# Patient Record
Sex: FEMALE | Race: WHITE | Employment: FULL TIME | URBAN - METROPOLITAN AREA
[De-identification: names, ages, dates, MRNs, and addresses within clinical notes are randomized per-mention and may not be internally consistent; named-entity substitution may affect disease eponyms.]

---

## 2020-09-30 ENCOUNTER — HOSPITAL ENCOUNTER (OUTPATIENT)
Dept: PHYSICAL THERAPY | Age: 60
Discharge: HOME | End: 2020-09-30
Payer: COMMERCIAL

## 2020-09-30 PROCEDURE — 97162 PT EVAL MOD COMPLEX 30 MIN: CPT | Performed by: PHYSICAL THERAPIST

## 2020-09-30 PROCEDURE — 97112 NEUROMUSCULAR REEDUCATION: CPT | Performed by: PHYSICAL THERAPIST

## 2020-09-30 NOTE — PROGRESS NOTES
PT INITIAL EVALUATION NOTE 2-15    Patient Name: Kelsie Quiroga  Date:2020  : 1960  [x]  Patient  Verified  Payor: BLUE CROSS / Plan: [address] [place] [address] / Product Type: PPO /    In time:10:35 am  Out time:11:40 am  Total Treatment Time (min): 65  Visit #: 1     Treatment Area: Scoliosis [M41.9]    SUBJECTIVE  Pain Level (0-10 scale): 2/10 up to 7/10  Any medication changes, allergies to medications, adverse drug reactions, diagnosis change, or new procedure performed?: [] No    [x] Yes (see summary sheet for update)  Subjective:     Her walking will release her low back and make her feel more mobile. She does daily yoga and feels like she is able to reduce her tightness and strengthen her core. The tightness between her shoulder blades reduces. She feels this mentally relaxes. Wears glasses - are trifocals and there is no blurring. Does not get dizziness. She is missing her back upper L tooth and she has not had this tooth for 3 years. Wearing [employer] indoor soccer shoe. Wears a lot of sandals and flats. Her previous surgery was Lumbar. Current symptoms are cervical.  PLOF: yoga (daily), swim (not this summer, but normally in summer), walking (about 2-4 miles each day for 5 days per week)  Mechanism of Injury: insidious onset of neck pain; dx with scoliosis at 9 y.o.a. - was not braced  Previous Treatment/Compliance: had 1 hr long session of yoga/pilates intervention  PMHx/Surgical Hx: Lumbar 3-5 fused 2017 secondary to lumbar radiculopathy that was persistent. She had min PT following.  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, lifting, driving, flying (lifting could be as high as 45 lb)  Living Situation: stairs 13 steps handrail bilaterally; lives alone, no pets  Pt Goals: to reduce neck pain and discomfort  Barriers: missing L tooth  Motivation: excellent   Substance use: none stated, see med list in pt chart   FABQ Score: see FOTO  Cognition: A & O x 4        OBJECTIVE/EXAMINATION    [facility] [place] (GILBERT) Evaluation    Posture: upright posture; in sitting was hyperextended through lumbar and lower thoracic spine  Other Observations: pt is thin             Left   Right  Standing  Standing Reach Test (M)    2 inches     Functional Squat Test  (P)    Level 2       Sitting  FA IR R.O.M. (M)     30 degrees  20 degrees  FA ER R.O.M.  (M)     40 degrees  40 degrees  FA IR Strength (M)           FA ER Strength (M)            Sidelying  Adduction Drop Test (M)    +   -  Hruska Adduction Lift Test (M)   1   1  Hruska Abduction Lift Test (M)        Pelvic Otter Tail Drop Test (PADT) (P)  +   +  Passive Abduction Raise Test (PART) (P)       Passive IP ER Expansion Test (P)          Supine  Extension Drop Test (M)    NT - to be tested; inadvertently omitted     Trunk Rotation (M)     14 inches  15 inches  Straight Leg Raise (M)       Apical Expansion (R)     Significantly limited bilaterally   *  Horizontal Abduction (R)    45 degrees  60 degrees  Shoulder Flexion (R)       HG IR (R)      70 degrees  50 degrees  Subclavius Flexibility (R)    X limited  X limited  Elevated and Externally Rotated Ant.  Ribs (R) Bilaterally, however to min/mod degree       CERVICAL-CRANIO-MANDIBULAR  Cervical Axial Rotation     60 degrees  90 degrees  Mandibular Opening     NT, however needs to be evaluated  Mandibular Lateral Trusion with Protrusion       Cervical Extension     flexible      5 min Therapeutic Activity: GILBERT Living manual given to pt   Rationale: increase strength, improve coordination, improve balance and increase proprioception  to improve the patients ability to return to N ADL skills     10 min Neuromuscular Re-education:  [x]  See flow sheet :   Rationale: increase ROM, increase strength, improve coordination, improve balance and increase proprioception  to improve the patients ability to reduce tightness in neck when in sitting and standing            With   [] TE   [] TA   [x] neuro   [] other: Patient Education: [x] Review HEP    [x] Progressed/Changed HEP based on:   [x] positioning   [x] body mechanics   [x] transfers   [] heat/ice application    [] other:        Other Objective/Functional Measures:FOTO to be given at next visit    Pain Level (0-10 scale) post treatment: 2/10      ASSESSMENT:      [x]  See Plan of Care      Ginny Parikh, PT, DPT, [facility] 9/30/2020

## 2020-09-30 NOTE — PROGRESS NOTES
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 Assessment/ jeong information: Zahra Herrera presents to our office with signs and symptoms consistent with cervical radiculopathy. She demonstrates postural dysfunction and the presence of PEC patterning per classification from [facility] [place] and will benefit from skilled PT intervention to allow return to N ADL and IADL performance without compensation present and with reduced symptomatology. Evaluation Complexity History MEDIUM  Complexity : 1-2 comorbidities / personal factors will impact the outcome/ POC ; Examination HIGH Complexity : 4+ Standardized tests and measures addressing body structure, function, activity limitation and / or participation in recreation  ;Presentation MEDIUM Complexity : Evolving with changing characteristics  ; Clinical Decision Making MEDIUM Complexity : FOTO score of 26-74  Overall Complexity Rating: MEDIUM    Problem List: pain affecting function, decrease ROM, decrease strength, impaired gait/ balance, decrease ADL/ functional abilitiies, decrease activity tolerance, decrease flexibility/ joint mobility and decrease transfer abilities   Treatment Plan may include any combination of the following: Therapeutic activities, Neuromuscular re-education, Physical agent/modality, Gait/balance training, Manual therapy, Patient education, Self Care training, Functional mobility training, Stair training and Other: GILBERT positioning  Patient / Family readiness to learn indicated by: asking questions, trying to perform skills and interest  Persons(s) to be included in education: patient (P)  Barriers to Learning/Limitations: no  Patient Goal (s): to have less neck tightness  Patient Self Reported Health Status: good  Rehabilitation Potential: good    Short Term Goals: To be accomplished in 4 treatments:  1) Patient will demonstrate Negative Hruska Adduction Drop Test   2) Patient will demonstrate independence with HEP  3) Patient will demonstrate greater than Grade II on Hruska Adduction Lift Test    Long Term Goals: To be accomplished in 12 treatments:  1)Patient will demonstrate Grade IV or Grade V Hruska Adduction Lift Score to allow for functional mobility in home and community settings  2)Pt will demonstrate the ability to ambulate forward and backward achieving bilateral Acetabular Femoral Internal Rotation for pain free mobility  3)Pt will demonstrate the ability to walk without subjective complaints or gait deviation  4)Pt will demonstrate independence with discharge HEP to allow for ambulation, sitting and standing without objective dysfunction    Frequency / Duration: Patient to be seen 1 times per week for 12-14 treatments. Patient/ Caregiver education and instruction: self care, activity modification and exercises    [x]  Plan of care has been reviewed with SUGAR Estrada, PT, DPT, [facility]   9/30/2020     ________________________________________________________________________    I certify that the above Therapy Services are being furnished while the patient is under my care.  I agree with the treatment plan and certify that this therapy is necessary.     [de-identified] Signature:____________________  Date:____________Time: _________

## 2020-10-08 ENCOUNTER — HOSPITAL ENCOUNTER (OUTPATIENT)
Dept: PHYSICAL THERAPY | Age: 60
Discharge: HOME | End: 2020-10-08
Payer: COMMERCIAL

## 2020-10-08 PROCEDURE — 97112 NEUROMUSCULAR REEDUCATION: CPT | Performed by: PHYSICAL MEDICINE & REHABILITATION

## 2020-10-08 NOTE — PROGRESS NOTES
PT DAILY TREATMENT NOTE - [facility] 2-15    Patient Name: Erin Caballero  Date:10/8/2020  : 1960  [x]  Patient  Verified  Payor: Basilio Portillo / Plan: [address] [place] [address] / Product Type: PPO /    In time:905 am  Out time:950 am  Total Treatment Time (min): 45  Total Timed Codes (min): 45  1:1 Treatment Time ( only): 39   Visit #: 2      Treatment Area: Scoliosis [M41.9]    SUBJECTIVE  Pain Level (0-10 scale): 2/10  Any medication changes, allergies to medications, adverse drug reactions, diagnosis change, or new procedure performed?: [x] No    [] Yes (see summary sheet for update)  Subjective functional status/changes:   [] No changes reported  Patient reported that she thinks she is doing the HEP right but wants to review. Patient stated she is about to drive 7 hours to [facility] this weekend.     OBJECTIVE     45 min Neuromuscular Re-education:  [x]  See flow sheet :   Rationale: increase ROM, increase strength, improve coordination and increase proprioception  to improve the patients ability to ADLs, standing and walking and driving tolerance          With   [x] TE   [] TA   [] neuro   [] other: Patient Education: [x] Review HEP    [] Progressed/Changed HEP based on:   [] positioning   [] body mechanics   [] transfers   [] heat/ice application    [] other:      Other Objective/Functional Measures:   HAdDT: + B L>R  PADT: + B  HGIR: + R  Horizontal Abd: Limited L  HAdLT: 2/5  Functional Squat: NT    Post 90/90 hip lift w/ R reach  HAdDT: - B  PADT: + B  HGIR: - B    PADT: + L following L SL knee toward knee but improvement       Pain Level (0-10 scale) post treatment: 0/10    ASSESSMENT/Changes in Function:     Patient will continue to benefit from skilled PT services to modify and progress therapeutic interventions, address functional mobility deficits, address ROM deficits, address strength deficits, analyze and address soft tissue restrictions, analyze and cue movement patterns, analyze and modify body mechanics/ergonomics and assess and modify postural abnormalities to attain remaining goals. []  See Plan of Care  []  See progress note/recertification  []  See Discharge Summary         Progress towards goals / Updated goals:  Patient tolerated todays GILBERT intervention very well. Given optimal life hand out and focus on driving positioning for long trip ahead.     PLAN  [x]  Upgrade activities as tolerated     [x]  Continue plan of care  [x]  Update interventions per flow sheet       []  Discharge due to:_  []  Other:_      Kodak Ernandez PTA, OPTA, CPT  10/8/2020

## 2020-10-20 ENCOUNTER — APPOINTMENT (OUTPATIENT)
Dept: PHYSICAL THERAPY | Age: 60
End: 2020-10-20
Payer: COMMERCIAL

## 2020-10-21 ENCOUNTER — APPOINTMENT (OUTPATIENT)
Dept: PHYSICAL THERAPY | Age: 60
End: 2020-10-21

## 2020-10-27 ENCOUNTER — APPOINTMENT (OUTPATIENT)
Dept: PHYSICAL THERAPY | Age: 60
End: 2020-10-27
Payer: COMMERCIAL

## 2020-11-03 ENCOUNTER — HOSPITAL ENCOUNTER (OUTPATIENT)
Dept: PHYSICAL THERAPY | Age: 60
Discharge: HOME | End: 2020-11-03
Payer: COMMERCIAL

## 2020-11-03 PROCEDURE — 97112 NEUROMUSCULAR REEDUCATION: CPT | Performed by: PHYSICAL THERAPIST

## 2020-11-03 NOTE — PROGRESS NOTES
PT DAILY TREATMENT NOTE - [facility] -15    Patient Name: Son Oates  Date:11/3/2020  : 1960  [x]  Patient  Verified  Payor: BLUE CROSS / Plan: [address] [place] [address] / Product Type: PPO /    In time:9:45 am  Out time:10:30 am  Total Treatment Time (min): 45  Total Timed Codes (min): 45  1:1 Treatment Time ( only): 39   Visit #: 3      Treatment Area: Scoliosis [M41.9]    SUBJECTIVE  Pain Level (0-10 scale): 2/10  Any medication changes, allergies to medications, adverse drug reactions, diagnosis change, or new procedure performed?: [x] No    [] Yes (see summary sheet for update)  Subjective functional status/changes:   [] No changes reported  Patient reports that she is getting to her exercises every couple of days. She is feeling about the same. She isn't sure if she is doing her exercises well. OBJECTIVE     45 min Neuromuscular Re-education:  [x]  See flow sheet :   Rationale: increase ROM, increase strength, improve coordination and increase proprioception  to improve the patients ability to ADLs, standing and walking and driving tolerance          With   [] TE   [] TA   [x] neuro   [] other: Patient Education: [x] Review HEP    [] Progressed/Changed HEP based on:   [] positioning   [] body mechanics   [] transfers   [] heat/ice application    [] other:      Other Objective/Functional Measures:   HAdDT: + L   PADT: + B  HGIR: + R (minimally)  Horizontal Abd: Limited L       Pain Level (0-10 scale) post treatment: 0/10    ASSESSMENT/Changes in Function:   Pt would benefit from increased and more frequent attendance to in-person clinical treatment as well as increased frequency of performance of HEP.    Patient will continue to benefit from skilled PT services to modify and progress therapeutic interventions, address functional mobility deficits, address ROM deficits, address strength deficits, analyze and address soft tissue restrictions, analyze and cue movement patterns, analyze and modify body mechanics/ergonomics and assess and modify postural abnormalities to attain remaining goals. []  See Plan of Care  [x]  See progress note/recertification  []  See Discharge Summary         Progress towards goals / Updated goals:  Pt is having difficulty with positioning and performance of current exercises secondary to difficulty with inhibition in the sagittal plane. Pt would benefit from weekly attendance to sessions for optimal benefit.     PLAN  [x]  Upgrade activities as tolerated     [x]  Continue plan of care  [x]  Update interventions per flow sheet       []  Discharge due to:_  []  Other:_      Naren Doe PT, DPT, [facility]   11/3/2020

## 2020-11-03 NOTE — PROGRESS NOTES
[employer] [facility]  [place]  [facility], [facility] [address]  Phone: [phone]      Fax:  [phone]    Progress Note    Name: Vy Arias   : 1960   MD: Yesenia Godoy*       Treatment Diagnosis: Scoliosis [M41.9]  Start of Care: 2020    Visits from Start of Care: 3  Missed Visits: last visit prior to today was 10/8/2020    Summary of Care: GILBERT intervention, HEP, positional and ADL suggestions, neuro re-education    Assessment / Recommendations:     Goal:1) Patient will demonstrate Negative Hruska Adduction Drop Test   Status at last note/certification:  Status at progress note: not met    Goal:2) Patient will demonstrate independence with HEP  Status at last note/certification:  Status at progress note: partially met    Goal:3) Patient will demonstrate greater than Grade II on Hruska Adduction Lift Test  Status at last note/certification:  Status at progress note: not met    Long Term Goals:  1)Patient will demonstrate Grade IV or Grade V Hruska Adduction Lift Score to allow for functional mobility in home and community settings  2)Pt will demonstrate the ability to ambulate forward and backward achieving bilateral Acetabular Femoral Internal Rotation for pain free mobility  3)Pt will demonstrate the ability to walk without subjective complaints or gait deviation  4)Pt will demonstrate independence with discharge HEP to allow for ambulation, sitting and standing without objective dysfunction    Other: cont per POC with recommended increased frequency of intervention      Dimple Babinski, PT, DPT, [facility] 11/3/2020    ________________________________________________________________________  NOTE TO PHYSICIAN:  Please complete the following and fax to: [facility]: [phone]  . Retain this original for your records.   If you are unable to process this request in 24 hours, please contact our office.        ____ I have read the above report and request that my patient continue therapy with the following changes/special instructions:  ____ I have read the above report and request that my patient be discharged from therapy    Physician's Signature:_________________ Date:___________Time:__________

## 2020-11-10 ENCOUNTER — APPOINTMENT (OUTPATIENT)
Dept: PHYSICAL THERAPY | Age: 60
End: 2020-11-10
Payer: COMMERCIAL

## 2021-03-29 NOTE — ANCILLARY DISCHARGE INSTRUCTIONS
[facility]  [address] [facility] [address]  Phone: [phone]      Fax:  [phone]    Discharge Summary    Name: Es Holguin   : 1960   MD: Glenna Tomlinson*       Treatment Diagnosis: Scoliosis [M41.9]  Start of Care: 2020    Visits from Start of Care: 3  Missed Visits: pt was last seen 11/3/2020    Summary of Care: GILBERT intervention, HEP, positional and ADL suggestions, neuro re-education. Pt would have benefitted from more frequent PT intervention. Pt did not return following visit 11/3/2020. Assessment / Recommendations:     Goal:1) Patient will demonstrate Negative Hruska Adduction Drop Test   Status at last note/certification:  Status at progress note: not met    Goal:2) Patient will demonstrate independence with HEP  Status at last note/certification:  Status at progress note: partially met    Goal:3) Patient will demonstrate greater than Grade II on Hruska Adduction Lift Test  Status at last note/certification:  Status at progress note: not met    Long Term Goals:  1)Patient will demonstrate Grade IV or Grade V Hruska Adduction Lift Score to allow for functional mobility in home and community settings  2)Pt will demonstrate the ability to ambulate forward and backward achieving bilateral Acetabular Femoral Internal Rotation for pain free mobility  3)Pt will demonstrate the ability to walk without subjective complaints or gait deviation  4)Pt will demonstrate independence with discharge HEP to allow for ambulation, sitting and standing without objective dysfunction    Discontinue therapy due to lack of attendance or compliance.       Ligia Ramirez, PT, DPT, [facility] 3/29/2021

## 2023-05-03 ENCOUNTER — OFFICE VISIT (OUTPATIENT)
Age: 63
End: 2023-05-03
Payer: COMMERCIAL

## 2023-05-03 VITALS
BODY MASS INDEX: 20.25 KG/M2 | HEIGHT: 66 IN | HEART RATE: 72 BPM | TEMPERATURE: 97.2 F | WEIGHT: 126 LBS | OXYGEN SATURATION: 98 %

## 2023-05-03 DIAGNOSIS — M53.3 SACROILIAC DYSFUNCTION: Primary | ICD-10-CM

## 2023-05-03 DIAGNOSIS — M79.2 NEURITIS: ICD-10-CM

## 2023-05-03 DIAGNOSIS — Z98.1 HISTORY OF LUMBAR FUSION: ICD-10-CM

## 2023-05-03 PROCEDURE — 99204 OFFICE O/P NEW MOD 45 MIN: CPT | Performed by: PHYSICAL MEDICINE & REHABILITATION

## 2023-05-03 RX ORDER — ZOLPIDEM TARTRATE 10 MG/1
1 TABLET ORAL NIGHTLY PRN
COMMUNITY
Start: 2020-09-02

## 2023-05-03 RX ORDER — PREDNISOLONE ACETATE 10 MG/ML
1 SUSPENSION/ DROPS OPHTHALMIC DAILY PRN
COMMUNITY
Start: 2023-04-24

## 2023-05-03 RX ORDER — CYCLOBENZAPRINE HCL 10 MG
10 TABLET ORAL 3 TIMES DAILY PRN
COMMUNITY
Start: 2017-01-20

## 2023-05-03 RX ORDER — MONTELUKAST SODIUM 10 MG/1
10 TABLET ORAL NIGHTLY
COMMUNITY
Start: 2023-04-24

## 2023-05-03 RX ORDER — TRAMADOL HYDROCHLORIDE 50 MG/1
1 TABLET ORAL DAILY PRN
COMMUNITY
Start: 2023-03-09

## 2023-05-03 NOTE — PROGRESS NOTES
Aj Mcgarry presents today for   Chief Complaint   Patient presents with    Back Pain       Is someone accompanying this pt? no    Is the patient using any DME equipment during OV? no      Coordination of Care:  1. Have you been to the ER, urgent care clinic since your last visit? no  Hospitalized since your last visit? no    2. Have you seen or consulted any other health care providers outside of the [address] [place] [address] since your last visit? Yes, pcp  Include any pap smears or colon screening.  no
sensation of weakness with toe rise on the right compared to the left. LE strength intact  SLR on the right and elicits paresthesias posterior thigh calf and bottom of foot. Negative on the left. Tender B/L SIJ R > L  Positive right MADALYN maneuver  Positive right greater than left thigh thrust  Positive compression test bilateral SIJ      Past Medical History:   Diagnosis Date    Scoliosis     Seasonal allergies     Spinal stenosis 3/6/2012         Past Surgical History:   Procedure Laterality Date    SPINAL FUSION  01/2018    L3/L4/L5         Current Outpatient Medications   Medication Sig Dispense Refill    cyclobenzaprine (FLEXERIL) 10 MG tablet Take 1 tablet by mouth 3 times daily as needed      montelukast (SINGULAIR) 10 MG tablet Take 1 tablet by mouth nightly      prednisoLONE acetate (PRED FORTE) 1 % ophthalmic suspension Place 1 drop into the left eye daily as needed      traMADol (ULTRAM) 50 MG tablet Take 1 tablet by mouth daily as needed. zolpidem (AMBIEN) 10 MG tablet Take 1 tablet by mouth nightly as needed. No current facility-administered medications for this visit. This note was created using Dragon transcription software and may contain unintended errors.

## 2023-05-30 ENCOUNTER — HOSPITAL ENCOUNTER (OUTPATIENT)
Facility: HOSPITAL | Age: 63
Discharge: HOME | End: 2023-06-02
Payer: COMMERCIAL

## 2023-05-30 VITALS
DIASTOLIC BLOOD PRESSURE: 90 MMHG | SYSTOLIC BLOOD PRESSURE: 151 MMHG | RESPIRATION RATE: 16 BRPM | TEMPERATURE: 98.5 F | OXYGEN SATURATION: 95 % | HEART RATE: 77 BPM

## 2023-05-30 PROCEDURE — 27096 INJECT SACROILIAC JOINT: CPT | Performed by: PHYSICAL MEDICINE & REHABILITATION

## 2023-05-30 PROCEDURE — G0260 INJ FOR SACROILIAC JT ANESTH: HCPCS

## 2023-05-30 PROCEDURE — 27096 INJECT SACROILIAC JOINT: CPT

## 2023-05-30 PROCEDURE — 6360000002 HC RX W HCPCS: Performed by: PHYSICAL MEDICINE & REHABILITATION

## 2023-05-30 PROCEDURE — 6370000000 HC RX 637 (ALT 250 FOR IP): Performed by: PHYSICAL MEDICINE & REHABILITATION

## 2023-05-30 PROCEDURE — 2500000003 HC RX 250 WO HCPCS: Performed by: PHYSICAL MEDICINE & REHABILITATION

## 2023-05-30 PROCEDURE — 6360000004 HC RX CONTRAST MEDICATION: Performed by: PHYSICAL MEDICINE & REHABILITATION

## 2023-05-30 RX ORDER — DEXAMETHASONE SODIUM PHOSPHATE 10 MG/ML
10 INJECTION, SOLUTION INTRAMUSCULAR; INTRAVENOUS ONCE
Status: COMPLETED | OUTPATIENT
Start: 2023-05-30 | End: 2023-05-30

## 2023-05-30 RX ORDER — DIAZEPAM 5 MG/1
2.5 TABLET ORAL ONCE
Status: COMPLETED | OUTPATIENT
Start: 2023-05-30 | End: 2023-05-30

## 2023-05-30 RX ORDER — LIDOCAINE HYDROCHLORIDE 10 MG/ML
30 INJECTION, SOLUTION EPIDURAL; INFILTRATION; INTRACAUDAL; PERINEURAL ONCE
Status: COMPLETED | OUTPATIENT
Start: 2023-05-30 | End: 2023-05-30

## 2023-05-30 RX ORDER — DIAZEPAM 5 MG/1
10 TABLET ORAL ONCE
Status: COMPLETED | OUTPATIENT
Start: 2023-05-30 | End: 2023-05-30

## 2023-05-30 RX ORDER — DIAZEPAM 5 MG/1
5 TABLET ORAL ONCE
Status: COMPLETED | OUTPATIENT
Start: 2023-05-30 | End: 2023-05-30

## 2023-05-30 RX ADMIN — DEXAMETHASONE SODIUM PHOSPHATE 10 MG: 10 INJECTION, SOLUTION INTRAMUSCULAR; INTRAVENOUS at 08:59

## 2023-05-30 RX ADMIN — DIAZEPAM 5 MG: 5 TABLET ORAL at 08:43

## 2023-05-30 RX ADMIN — IOPAMIDOL 1 ML: 408 INJECTION, SOLUTION INTRATHECAL at 08:59

## 2023-05-30 RX ADMIN — LIDOCAINE HYDROCHLORIDE 15 ML: 10 INJECTION, SOLUTION EPIDURAL; INFILTRATION; INTRACAUDAL; PERINEURAL at 08:57

## 2023-05-30 ASSESSMENT — PAIN - FUNCTIONAL ASSESSMENT: PAIN_FUNCTIONAL_ASSESSMENT: 0-10

## 2023-05-30 ASSESSMENT — PAIN SCALES - GENERAL: PAINLEVEL_OUTOF10: 7

## 2023-05-30 NOTE — PROCEDURES
Unilateral Sacroiliac Joint Injection Procedure Note    Patient Name: Heather Danielson    Date of Procedure: May 30, 2023    Preoperative Diagnosis:Sacroiliac Dysfunction    Post Operative Diagnosis: same    Procedure: SI Joint Injection, Intra-articular and extra-articular - Unilateral  right    Consent: Informed consent was obtained prior to the procedure. The patient was given the opportunity to ask questions regarding the procedure and its associated risks. In addition to the potential risks associated with the procedure itself, the patient was informed both verbally and in writing of potential side effects of the use of corticosteroids. The patient appeared to comprehend the informed consent and desired to have the procedure performed. Procedure: The patient was placed in the prone position on the flouroscopy table and the back was prepped and draped in the usual sterile manner. After local Lidocaine 1% infiltration, a #22 gauge spinal needle was then advanced to lie within the Sacroiliac Joint. Yes about 1cc of Isovue was used to confirm placement, no vascular uptake was identified. A total of 10 mg of Dexamethasone  and 5 ml of Lidocaine was introduced in and around the joint. The injection area was cleaned and bandaids applied. No excessive bleeding was noted. Patient dressed and was discharged to home with instructions. Discussion:  The patient tolerated the procedure well. Patient reported ruth-procedural pain on Visual Analog Scale:  pre-7; post-7. Patient denies any worsening/new symptoms. Discussed piriformis and iliopsoas stretches anteverted SIJ.          Dwight Hubbard MD  May 30, 2023

## 2023-05-30 NOTE — INTERVAL H&P NOTE
Update History & Physical    The patient's History and Physical of May 3, 2023 was reviewed. There was no change. The surgical site was confirmed by the patient and me. Plan: The risks, benefits, expected outcome, and alternative to the recommended procedure have been discussed with the patient. Patient understands and wants to proceed with the procedure.      Electronically signed by Micah Littlejohn MD on 5/30/2023 at 8:48 AM

## 2023-06-28 ENCOUNTER — OFFICE VISIT (OUTPATIENT)
Age: 63
End: 2023-06-28
Payer: COMMERCIAL

## 2023-06-28 VITALS
HEART RATE: 65 BPM | WEIGHT: 127 LBS | HEIGHT: 66 IN | BODY MASS INDEX: 20.41 KG/M2 | TEMPERATURE: 98 F | OXYGEN SATURATION: 100 %

## 2023-06-28 DIAGNOSIS — M79.18 CHRONIC GLUTEAL PAIN: Primary | ICD-10-CM

## 2023-06-28 DIAGNOSIS — G89.29 CHRONIC GLUTEAL PAIN: Primary | ICD-10-CM

## 2023-06-28 DIAGNOSIS — M53.3 SACROILIAC DYSFUNCTION: ICD-10-CM

## 2023-06-28 DIAGNOSIS — Z98.1 HISTORY OF LUMBAR FUSION: ICD-10-CM

## 2023-06-28 PROCEDURE — 99213 OFFICE O/P EST LOW 20 MIN: CPT | Performed by: PHYSICAL MEDICINE & REHABILITATION

## 2023-06-28 RX ORDER — ALENDRONATE SODIUM 35 MG/1
1 TABLET ORAL
COMMUNITY
Start: 2023-06-08

## 2023-06-28 RX ORDER — OLMESARTAN MEDOXOMIL 20 MG/1
1 TABLET ORAL DAILY
COMMUNITY
Start: 2023-06-26

## 2023-09-06 ENCOUNTER — OFFICE VISIT (OUTPATIENT)
Age: 63
End: 2023-09-06
Payer: COMMERCIAL

## 2023-09-06 VITALS
OXYGEN SATURATION: 98 % | RESPIRATION RATE: 18 BRPM | HEART RATE: 71 BPM | BODY MASS INDEX: 20.28 KG/M2 | TEMPERATURE: 97.9 F | WEIGHT: 126.2 LBS | HEIGHT: 66 IN

## 2023-09-06 DIAGNOSIS — M79.18 CHRONIC GLUTEAL PAIN: ICD-10-CM

## 2023-09-06 DIAGNOSIS — G89.29 CHRONIC GLUTEAL PAIN: ICD-10-CM

## 2023-09-06 DIAGNOSIS — Z98.1 HISTORY OF LUMBAR FUSION: Primary | ICD-10-CM

## 2023-09-06 PROCEDURE — 99214 OFFICE O/P EST MOD 30 MIN: CPT | Performed by: PHYSICAL MEDICINE & REHABILITATION

## 2023-09-06 NOTE — PROGRESS NOTES
Donita Spangler presents today for   Chief Complaint   Patient presents with    Hip Pain    Back Problem       Is someone accompanying this pt? no    Is the patient using any DME equipment during OV? no    Depression Screening:  No flowsheet data found. Learning Assessment:  No flowsheet data found. Abuse Screening:  No flowsheet data found. Fall Risk  No flowsheet data found. OPIOID RISK TOOL  No flowsheet data found. Coordination of Care:  1. Have you been to the ER, urgent care clinic since your last visit? no  Hospitalized since your last visit? no    2. Have you seen or consulted any other health care providers outside of the [address] [place] [address] since your last visit? no Include any pap smears or colon screening.  no
Inspection:   Alignment: Normal  Atrophy: None       Tenderness to Palpation:   Lumbar paraspinals Positive right  Lumbar spinous processes Negative  SI Joint:  Positive right  Gluteal:Positive  right upper  Greater trochanter: Negative  IT Band:Negative  Piriformis neg    ROM:   Lumbar ROM: Abnormal limited flexion and extension pain with both  Lumbar facet loading: Negative  Hip ROM: No reproduction of pain with movement     Special Tests      Slump test: Negative  SLR: Negative  Femoral stretch: Negative  Prone Instability Test:Negative  MADALYN: Negative  FADIR: Negative  Scour:  Negative  Stinchfield: Negative  Log Roll: Negative  SI joint compression: Negative  Negative Frieberg  Weakness bilateral right> hip abduction, IR and ER              Assessment:   - scoliosis  -L3-5 fusion  - right low back /gluteal pain    Plan:      -Physical therapy -  referral to PT for gluteal strengthening  -Medications - NA. Counseled regarding side effects and appropriate administration of medications.    -Diagnostics/Imaging - CT lumbar spine    -Injections - Consider cluneal nerve injections   -Lifestyle - Discussed gluteal strengthening exercises    -Education - The patient's diagnosis, prognosis and treatment options were discussed today. All questions were answered. F/U - after CT or sooner if needed.   Consider cluneal nerve injections          Symone Magaña MD  [facility]

## 2023-09-06 NOTE — PATIENT INSTRUCTIONS
[address] [facility] Radiology    Please expect an automated call within 24-48 business hours to schedule your outpatient study with [address] [place] [address]    If you have not received an automated call, please call [phone] to speak directly with a     [address] [place] [address] at [facility]

## 2023-09-22 ENCOUNTER — HOSPITAL ENCOUNTER (OUTPATIENT)
Facility: HOSPITAL | Age: 63
Discharge: HOME | End: 2023-09-22
Attending: PHYSICAL MEDICINE & REHABILITATION
Payer: COMMERCIAL

## 2023-09-22 DIAGNOSIS — G89.29 CHRONIC GLUTEAL PAIN: ICD-10-CM

## 2023-09-22 DIAGNOSIS — Z98.1 HISTORY OF LUMBAR FUSION: ICD-10-CM

## 2023-09-22 DIAGNOSIS — M79.18 CHRONIC GLUTEAL PAIN: ICD-10-CM

## 2023-09-22 PROCEDURE — 72131 CT LUMBAR SPINE W/O DYE: CPT

## 2023-10-09 ENCOUNTER — HOSPITAL ENCOUNTER (OUTPATIENT)
Facility: HOSPITAL | Age: 63
Setting detail: RECURRING SERIES
Discharge: HOME | End: 2023-10-12
Payer: COMMERCIAL

## 2023-10-09 ENCOUNTER — OFFICE VISIT (OUTPATIENT)
Age: 63
End: 2023-10-09
Payer: COMMERCIAL

## 2023-10-09 VITALS
OXYGEN SATURATION: 97 % | HEIGHT: 66 IN | HEART RATE: 78 BPM | BODY MASS INDEX: 20.34 KG/M2 | DIASTOLIC BLOOD PRESSURE: 66 MMHG | TEMPERATURE: 98.1 F | WEIGHT: 126.6 LBS | SYSTOLIC BLOOD PRESSURE: 114 MMHG | RESPIRATION RATE: 18 BRPM

## 2023-10-09 DIAGNOSIS — Z98.1 HISTORY OF LUMBAR FUSION: ICD-10-CM

## 2023-10-09 DIAGNOSIS — M79.18 CHRONIC GLUTEAL PAIN: Primary | ICD-10-CM

## 2023-10-09 DIAGNOSIS — G89.29 CHRONIC GLUTEAL PAIN: Primary | ICD-10-CM

## 2023-10-09 DIAGNOSIS — M53.3 SACROILIAC DYSFUNCTION: ICD-10-CM

## 2023-10-09 PROCEDURE — 97162 PT EVAL MOD COMPLEX 30 MIN: CPT

## 2023-10-09 PROCEDURE — 99214 OFFICE O/P EST MOD 30 MIN: CPT | Performed by: PHYSICAL MEDICINE & REHABILITATION

## 2023-10-09 PROCEDURE — 97110 THERAPEUTIC EXERCISES: CPT

## 2023-10-09 PROCEDURE — 97535 SELF CARE MNGMENT TRAINING: CPT

## 2023-10-09 RX ORDER — DULOXETIN HYDROCHLORIDE 20 MG/1
20 CAPSULE, DELAYED RELEASE ORAL DAILY
Qty: 30 CAPSULE | Refills: 0 | Status: SHIPPED | OUTPATIENT
Start: 2023-10-09

## 2023-10-09 NOTE — PROGRESS NOTES
PHYSICAL / OCCUPATIONAL THERAPY - DAILY TREATMENT NOTE (updated )    Patient Name: Carolyne Blackmon    Date: 10/9/2023    : 1960  Insurance: Payor: Angelina Carrier / Plan: Berny Rico / Product Type: *No Product type* /      Patient  verified Yes     Visit #   Current / Total 1 6   Time   In / Out 0810 0850   Pain   In / Out 8 8   Subjective Functional Status/Changes: The pt has c/o LBP and B hip pain. Pain down LE at times. Changes to: Allergies, Med Hx, Sx Hx?   no       TREATMENT AREA =  Low back pain [M54.50]  Chronic gluteal pain [M79.18, G89.29]    OBJECTIVE    Modalities Rationale:        min [] Estim Unattended, type/location:                                      []  w/ice    []  w/heat    min [] Estim Attended, type/location:                                     []  w/US     []  w/ice    []  w/heat    []  TENS insruct      min []  Mechanical Traction: type/lbs                   []  pro   []  sup   []  int   []  cont    []  before manual    []  after manual    min []  Ultrasound, settings/location:      min []  Iontophoresis w/ dexamethasone, location:                                               []  take home patch       []  in clinic        min  unbilled []  Ice     []  Heat    location/position:     min []  Paraffin,  details:     min []  Vasopneumatic Device, press/temp:     min []  Eldonna Flight / Reola Small: If using vaso (only need to measure limb vaso being performed on)      pre-treatment girth :       post-treatment girth :       measured at (landmark location) :      min []  Other:    Skin assessment post-treatment (if applicable):    []  intact    []  redness- no adverse reaction                 []redness - adverse reaction:         Therapeutic Procedures:   Tx Min Billable or 1:1 Min (if diff from Tx Min) Procedure, Rationale, Specifics   15  12328 Therapeutic Exercise (timed):  increase ROM, strength, coordination, balance, and proprioception to improve patient's ability to progress

## 2023-10-09 NOTE — PROGRESS NOTES
[address] [facility] [address] [facility] [place]:[phone] Fx: [phone]    PLAN OF CARE/ Statement of Necessity for Physical Therapy Services           Patient name: Fede Clark Start of Care: 10/9/2023   Referral source: Cale Márquez* : 1960    Medical Diagnosis: Low back pain [M54.50]  Chronic gluteal pain [M79.18, G89.29]       Onset Date:10-1-22    Treatment Diagnosis: M54.59  OTHER LOWER BACK PAIN                                     Prior Hospitalization: see medical history Provider#: [id number]   Medications: Verified on Patient Summary List     Comorbidities: HTN, LBP, lumbar fusion 2018  Prior Level of Function: functionally I with all activities    The Plan of Care and following information is based on the information from the initial evaluation. Assessment / key information:  56 y/o female presents with c/o LBP and B glute region pain that initially started several years ago but had improved following surgery in 2018. Over the past year the pt reports her pain has returned. The pt reports radicular type symptoms at times, primarily into the right LE. The pt reports sitting and driving both seem to exacerbate her symptoms. The pt reports going for chiropractic care and massage therapy with some benefit but reports relief is only temporary. The pt demonstrates limited lumbar AROM with primary limitation being into flexion. + pain is reported with flexion. Myofascial restrictions are noted throughout the lumbar paraspinals with tenderness. Tenderness is also noted over the right glute med. Decreased flexibility is noted of B LE hamstrings, hip flexors and quads. MMT reveals weakness of B glute med and max. Decreased core stability is noted as well. The pt will benefit from PT to address the aforementioned impairments. The pt is limited to attend 1 time per week due to her work travel.      Evaluation

## 2023-10-09 NOTE — PROGRESS NOTES
Rubenjuana Lainez presents today for   Chief Complaint   Patient presents with    Back Problem    Hip Pain    Pain       Is someone accompanying this pt? no    Is the patient using any DME equipment during OV? no    Depression Screening:       No data to display                Learning Assessment:  No flowsheet data found. Abuse Screening:       No data to display                Fall Risk  No flowsheet data found. OPIOID RISK TOOL  No flowsheet data found. Coordination of Care:  1. Have you been to the ER, urgent care clinic since your last visit? no  Hospitalized since your last visit? no    2. Have you seen or consulted any other health care providers outside of the [address] [place] [address] since your last visit? no Include any pap smears or colon screening.  no
take with tramadol and reviewed risk of serotonin syndrome if medications are taken together. Counseled regarding side effects and appropriate administration of medications.    -Diagnostics/Imaging - CT lumbar spine  - reviewed  -Injections -will try right SI joint injection vs cluneal nerve injections ultrasound guided  -Lifestyle - Discussed gluteal strengthening exercises    -Education - The patient's diagnosis, prognosis and treatment options were discussed today. All questions were answered.     F/U -in 5 weeks         [address] [place] [address] and [facility]

## 2023-10-11 ENCOUNTER — TELEPHONE (OUTPATIENT)
Age: 63
End: 2023-10-11

## 2023-10-11 NOTE — TELEPHONE ENCOUNTER
[facility] on [address] advised patient they need to hear from us regarding prescription DULoxetine (CYMBALTA) 20 MG extended release capsule. She says they have sent a couple of faxes to us. Please contact them so patient can fill prescription.

## 2023-10-20 ENCOUNTER — HOSPITAL ENCOUNTER (OUTPATIENT)
Facility: HOSPITAL | Age: 63
Setting detail: RECURRING SERIES
Discharge: HOME | End: 2023-10-23
Payer: COMMERCIAL

## 2023-10-20 PROCEDURE — 97110 THERAPEUTIC EXERCISES: CPT

## 2023-10-20 PROCEDURE — 97140 MANUAL THERAPY 1/> REGIONS: CPT

## 2023-10-20 PROCEDURE — 97112 NEUROMUSCULAR REEDUCATION: CPT

## 2023-10-20 NOTE — PROGRESS NOTES
PHYSICAL / OCCUPATIONAL THERAPY - DAILY TREATMENT NOTE (updated )    Patient Name: Hawk Claire    Date: 10/20/2023    : 1960  Insurance: Payor: Nelli Single / Plan: [place] / Product Type: *No Product type* /      Patient  verified Yes     Visit #   Current / Total 2 6   Time   In / Out 7:52 8:36   Pain   In / Out 7/10 5/10   Subjective Functional Status/Changes: \"I've been doing the exercises but the QP leg left caused left hip/sciatic pain. \"   Changes to: Allergies, Med Hx, Sx Hx?   no       TREATMENT AREA =  Low back pain [M54.50]  Chronic gluteal pain [M79.18, G89.29]    OBJECTIVE    Therapeutic Procedures: Tx Min Billable or 1:1 Min (if diff from Tx Min) Procedure, Rationale, Specifics   16  27010 Therapeutic Exercise (timed):  increase ROM, strength, coordination, balance, and proprioception to improve patient's ability to progress to PLOF and address remaining functional goals. (see flow sheet as applicable)    Details if applicable:       20  74012 Neuromuscular Re-Education (timed):  improve balance, coordination, kinesthetic sense, posture, core stability and proprioception to improve patient's ability to develop conscious control of individual muscles and awareness of position of extremities in order to progress to PLOF and address remaining functional goals. (see flow sheet as applicable)    Details if applicable:  Dynamic step ups, CoreAlign and tera series. 8  01961 Manual Therapy (timed):  decrease pain, increase ROM, increase tissue extensibility, and decrease trigger points to improve patient's ability to progress to PLOF and address remaining functional goals. The manual therapy interventions were performed at a separate and distinct time from the therapeutic activities interventions . Details:        Details if applicable:  Graston technique to (B) L/S paraspinals and QL. Pt prone.    40  [address] Reminder: bill using total billable min of TIMED therapeutic

## 2023-10-27 ENCOUNTER — HOSPITAL ENCOUNTER (OUTPATIENT)
Facility: HOSPITAL | Age: 63
Setting detail: RECURRING SERIES
End: 2023-10-27
Payer: COMMERCIAL

## 2023-11-06 ENCOUNTER — HOSPITAL ENCOUNTER (OUTPATIENT)
Facility: HOSPITAL | Age: 63
Setting detail: RECURRING SERIES
Discharge: HOME | End: 2023-11-09
Payer: COMMERCIAL

## 2023-11-06 PROCEDURE — 97140 MANUAL THERAPY 1/> REGIONS: CPT

## 2023-11-06 PROCEDURE — 97110 THERAPEUTIC EXERCISES: CPT

## 2023-11-06 PROCEDURE — 97112 NEUROMUSCULAR REEDUCATION: CPT

## 2023-11-06 NOTE — PROGRESS NOTES
PHYSICAL / OCCUPATIONAL THERAPY - DAILY TREATMENT NOTE (updated )    Patient Name: Obey Snow    Date: 2023    : 1960  Insurance: Payor: Joaquin Byrne / Plan: [place] / Product Type: *No Product type* /      Patient  verified Yes     Visit #   Current / Total 3 6   Time   In / Out 10:32 11:14   Pain   In / Out 6/10 6/10   Subjective Functional Status/Changes: \"I started having some sciatica down my Left leg so I've been cautious. \"   Changes to: Allergies, Med Hx, Sx Hx?   no       TREATMENT AREA =  Low back pain [M54.50]  Chronic gluteal pain [M79.18, G89.29]    OBJECTIVE    Therapeutic Procedures: Tx Min Billable or 1:1 Min (if diff from Tx Min) Procedure, Rationale, Specifics   24 21 13342 Therapeutic Exercise (timed):  increase ROM, strength, coordination, balance, and proprioception to improve patient's ability to progress to PLOF and address remaining functional goals. (see flow sheet as applicable)    Details if applicable:       10 10 82750 Neuromuscular Re-Education (timed):  improve balance, coordination, kinesthetic sense, posture, core stability and proprioception to improve patient's ability to develop conscious control of individual muscles and awareness of position of extremities in order to progress to PLOF and address remaining functional goals. (see flow sheet as applicable)    Details if applicable:  QP series, dynamic step ups. 8 8 79724 Manual Therapy (timed):  decrease pain, increase ROM, increase tissue extensibility, and decrease trigger points to improve patient's ability to progress to PLOF and address remaining functional goals. The manual therapy interventions were performed at a separate and distinct time from the therapeutic activities interventions . Details:        Details if applicable:  STM/DTM Left glute med, piriformis, QL, L/S paraspinals. Pt right side-lying. MFR to low back musculature, pt prone.    43 [address] [employer] Reminder: bill using total

## 2023-11-06 NOTE — PROGRESS NOTES
[address] [place] [address] PHYSICAL THERAPY  [address] [facility] - Ph: [phone]   Fx: [phone]    PHYSICAL THERAPY PROGRESS NOTE      Patient name: Latasha Katz Start of Care: 10/9/2023   Referral source: Gertrude Irby* : 1960    Medical Diagnosis: Low back pain [M54.50]  Chronic gluteal pain [M79.18, G89.29]  Payor: [place] Castor / Plan: Meg Parkinson / Product Type: *No Product type* /  Onset Date:10/1/22    Treatment Diagnosis: M54.59  OTHER LOWER BACK PAIN    Prior Hospitalization: see medical history Provider#: [id number]   Medications: Verified on Patient summary List   Comorbidities: HTN, LBP, lumbar fusion 2018  Prior Level of Function:functionally I with all activities    Visits from Start of Care: 3    Missed Visits: 1    Goals/Measure of Progress: To be achieved in 4 weeks:    Short Term Goals: To be accomplished in 2 weeks  The pt will be I and compliant with HEP   IE- issued HEP  PN: Met, pt reports compliance. Long Term Goals: To be accomplished in 6 weeks  Improve FOTO score to the predicted outcome for improved ability for ADL  IE- 61  PN: 56%. 2. The pt will report at least 50% improvement for improved tolerance to daily tasks              IE- 0 worsening              PN: No change in LBP, new Left LE radiculopathy symptoms. 3. Improve B glute med MMT to 4/5 for improved ability for functional tasks              IE- 3+/5 B              PN: No change, (B) 3+/5.  4. Improve B glute max MMT to 4/5 for improved ability for functional tasks              IE- 3+/5 B              PN: No change, (B) 3+/5. Summary of Care/ Key Functional Changes:   FOTO 56%. MMT Glute med Left 3+/5, Right 3+/5, glute max Left 3+/5, Right 3+/5. Pt reports no subjective improvement in low back symptoms. Stated \"10 days ago I started to have pain down my left hip and into my big toe, I can not lay on my left side. \" Held several exercises today in

## 2023-11-07 DIAGNOSIS — G89.29 CHRONIC GLUTEAL PAIN: ICD-10-CM

## 2023-11-07 DIAGNOSIS — M79.18 CHRONIC GLUTEAL PAIN: ICD-10-CM

## 2023-11-07 DIAGNOSIS — Z98.1 HISTORY OF LUMBAR FUSION: ICD-10-CM

## 2023-11-07 RX ORDER — DULOXETIN HYDROCHLORIDE 20 MG/1
20 CAPSULE, DELAYED RELEASE ORAL DAILY
Qty: 30 CAPSULE | Refills: 0 | Status: SHIPPED | OUTPATIENT
Start: 2023-11-07

## 2023-11-14 ENCOUNTER — HOSPITAL ENCOUNTER (OUTPATIENT)
Facility: HOSPITAL | Age: 63
Setting detail: RECURRING SERIES
Discharge: HOME | End: 2023-11-17
Payer: COMMERCIAL

## 2023-11-14 PROCEDURE — 97112 NEUROMUSCULAR REEDUCATION: CPT

## 2023-11-14 PROCEDURE — 97110 THERAPEUTIC EXERCISES: CPT

## 2023-11-14 PROCEDURE — 97140 MANUAL THERAPY 1/> REGIONS: CPT

## 2023-11-14 NOTE — PROGRESS NOTES
deficits, analyze and address ROM deficits, analyze and address strength deficits, analyze and address soft tissue restrictions, and analyze and cue for proper movement patterns to address functional deficits and attain remaining goals. Progress toward goals / Updated goals:  []  See Progress Note/Recertification    Goals/Measure of Progress: To be achieved in 4 weeks:     Short Term Goals: To be accomplished in 2 weeks  The pt will be I and compliant with HEP   IE- issued HEP  PN: Met, pt reports compliance. Long Term Goals: To be accomplished in 6 weeks  Improve FOTO score to the predicted outcome for improved ability for ADL  IE- 61  PN: 56%. 2. The pt will report at least 50% improvement for improved tolerance to daily tasks              IE- 0 worsening              PN: No change in LBP, new Left LE radiculopathy symptoms. 3. Improve B glute med MMT to 4/5 for improved ability for functional tasks              IE- 3+/5 B              PN: No change, (B) 3+/5.  4. Improve B glute max MMT to 4/5 for improved ability for functional tasks              IE- 3+/5 B              PN: No change, (B) 3+/5.     PLAN  Yes  Continue plan of care  []  Upgrade activities as tolerated  []  Discharge due to :  []  Other:    Mitzy Pagan PT    11/14/2023    8:25 AM    Future Appointments   Date Time Provider [address] [place] [address]   11/14/2023  8:30 AM Mitzy Pagan PT [id number]   11/15/2023  8:45 AM MD GRZEGORZ Wh[facility] AMB   11/27/2023 10:30 AM Edison Roldan MD [place] [facility] AMB

## 2023-11-15 ENCOUNTER — OFFICE VISIT (OUTPATIENT)
Age: 63
End: 2023-11-15
Payer: COMMERCIAL

## 2023-11-15 VITALS
OXYGEN SATURATION: 97 % | HEART RATE: 74 BPM | HEIGHT: 66 IN | TEMPERATURE: 96.8 F | BODY MASS INDEX: 20.25 KG/M2 | WEIGHT: 126 LBS

## 2023-11-15 DIAGNOSIS — M79.18 CHRONIC GLUTEAL PAIN: Primary | ICD-10-CM

## 2023-11-15 DIAGNOSIS — M70.62 GREATER TROCHANTERIC BURSITIS OF LEFT HIP: ICD-10-CM

## 2023-11-15 DIAGNOSIS — M79.2 NEURITIS: ICD-10-CM

## 2023-11-15 DIAGNOSIS — G89.29 CHRONIC GLUTEAL PAIN: Primary | ICD-10-CM

## 2023-11-15 PROCEDURE — 99214 OFFICE O/P EST MOD 30 MIN: CPT | Performed by: PHYSICAL MEDICINE & REHABILITATION

## 2023-11-15 PROCEDURE — 20611 DRAIN/INJ JOINT/BURSA W/US: CPT | Performed by: PHYSICAL MEDICINE & REHABILITATION

## 2023-11-15 PROCEDURE — 1123F ACP DISCUSS/DSCN MKR DOCD: CPT | Performed by: PHYSICAL MEDICINE & REHABILITATION

## 2023-11-15 RX ORDER — LIDOCAINE HYDROCHLORIDE 10 MG/ML
4 INJECTION, SOLUTION INFILTRATION; PERINEURAL ONCE
Status: COMPLETED | OUTPATIENT
Start: 2023-11-15 | End: 2023-11-15

## 2023-11-15 RX ORDER — TRIAMCINOLONE ACETONIDE 40 MG/ML
40 INJECTION, SUSPENSION INTRA-ARTICULAR; INTRAMUSCULAR ONCE
Status: COMPLETED | OUTPATIENT
Start: 2023-11-15 | End: 2023-11-15

## 2023-11-15 RX ORDER — DULOXETIN HYDROCHLORIDE 30 MG/1
30 CAPSULE, DELAYED RELEASE ORAL DAILY
Qty: 60 CAPSULE | Refills: 0 | Status: SHIPPED | OUTPATIENT
Start: 2023-11-15 | End: 2024-01-14

## 2023-11-15 RX ADMIN — LIDOCAINE HYDROCHLORIDE 4 ML: 10 INJECTION, SOLUTION INFILTRATION; PERINEURAL at 09:26

## 2023-11-15 RX ADMIN — TRIAMCINOLONE ACETONIDE 40 MG: 40 INJECTION, SUSPENSION INTRA-ARTICULAR; INTRAMUSCULAR at 09:27

## 2023-11-15 NOTE — PROGRESS NOTES
Matthew Echevarria presents today for   Chief Complaint   Patient presents with    Lower Back Pain    Hip Pain     left    Leg Pain     Left lower       Is someone accompanying this pt? no    Is the patient using any DME equipment during OV? no      Coordination of Care:  1. Have you been to the ER, urgent care clinic since your last visit? no  Hospitalized since your last visit? no    2. Have you seen or consulted any other health care providers outside of the [address] [place] [address] since your last visit? Yes, pcp and physical therapy  Include any pap smears or colon screening.  no

## 2023-11-15 NOTE — PROGRESS NOTES
[facility]  [address] [place] [address]   Phone: [phone]  Fax: [phone]      Patient: Bethany Benavides                                                                              MRN: [id number]        YOB: 1960          AGE: 70 y.o. PCP: Herson De Santiago, DO  Date:  11/15/23    Reason for Consultation: Lower Back Pain, Hip Pain (left), and Leg Pain (Left lower)      HPI:  Bethany Benavides is a 70 y.o. female with relevant PMH of scoliosis  L3-5 PSI fusion fusion in 1/3/2017 who presented with low back pain and gluteal pain. She did well post op but in 2020 her pain returned. She intermittently has pain radiating down her right leg with numbness and tingling - she had some chiropractic treatments which helped. She has recently started PT. CT lumbar spine without any significant spinal stenosis or nerve impingement. She has started cymbalta which seems to help with her gluteal pain. Recently while doing new PT exercises ( fire hydrant) she started to notice pain radiating down her left leg. She also has pain on her left lateral hip which makes it difficult for her to lie on her left side. Works as     Neurologic symptoms: numbness, tingling weakness, bowel or bladder changes. No recent falls      Location: The pain is located in the right gluteal   2. Left lateral hip radiating down the left leg. Radiation: The pain does radiate down right leg posterior thigh towards the foot with tinglingt- has improved . Pain Score: Currently: 8/10    Quality: Pain is of a aching, stabbing, stiff, tight quality. Aggravating: Pain is exacerbated by  driving   Alleviating: The pain is alleviated by  massage  , walking     Prior Treatments:  Physical therapy: Yes. 2020 Yoga, stretching . Started PT10/9/2023  Chiropractic: yes  Injections:Yes- most recently.   Right SI joint injection- no

## 2023-11-22 ENCOUNTER — TELEPHONE (OUTPATIENT)
Facility: HOSPITAL | Age: 63
End: 2023-11-22

## 2023-11-27 ENCOUNTER — PROCEDURE VISIT (OUTPATIENT)
Age: 63
End: 2023-11-27
Payer: COMMERCIAL

## 2023-11-27 VITALS
HEART RATE: 78 BPM | WEIGHT: 122 LBS | SYSTOLIC BLOOD PRESSURE: 132 MMHG | DIASTOLIC BLOOD PRESSURE: 89 MMHG | HEIGHT: 66 IN | BODY MASS INDEX: 19.61 KG/M2 | OXYGEN SATURATION: 99 % | TEMPERATURE: 97.6 F

## 2023-11-27 DIAGNOSIS — G89.29 CHRONIC GLUTEAL PAIN: Primary | ICD-10-CM

## 2023-11-27 DIAGNOSIS — M79.18 CHRONIC GLUTEAL PAIN: Primary | ICD-10-CM

## 2023-11-27 DIAGNOSIS — M79.2 NEURITIS: ICD-10-CM

## 2023-11-27 DIAGNOSIS — Z98.1 HISTORY OF LUMBAR FUSION: ICD-10-CM

## 2023-11-27 DIAGNOSIS — M70.62 GREATER TROCHANTERIC BURSITIS OF LEFT HIP: ICD-10-CM

## 2023-11-27 PROCEDURE — 99214 OFFICE O/P EST MOD 30 MIN: CPT | Performed by: PHYSICAL MEDICINE & REHABILITATION

## 2023-11-27 PROCEDURE — 1123F ACP DISCUSS/DSCN MKR DOCD: CPT | Performed by: PHYSICAL MEDICINE & REHABILITATION

## 2023-11-27 RX ORDER — DULOXETIN HYDROCHLORIDE 30 MG/1
30 CAPSULE, DELAYED RELEASE ORAL 2 TIMES DAILY
Qty: 180 CAPSULE | Refills: 0 | Status: SHIPPED | OUTPATIENT
Start: 2023-11-27 | End: 2024-02-25

## 2023-11-27 RX ORDER — GABAPENTIN 100 MG/1
100 CAPSULE ORAL NIGHTLY
Qty: 90 CAPSULE | Refills: 0 | Status: SHIPPED | OUTPATIENT
Start: 2023-11-27 | End: 2024-02-25

## 2023-11-27 NOTE — PROGRESS NOTES
[facility]  [address] [place] [address]   Phone: [phone]  Fax: [phone]      Patient: Armando Perez                                                                              MRN: [id number]        YOB: 1960          AGE: 70 y.o. PCP: Raul Gross DO  Date:  11/27/23    Reason for Consultation: Lower Back Pain      HPI:  Armando Perez is a 70 y.o. female with relevant PMH of scoliosis  L3-5 PSI fusion fusion in 1/3/2017 who presented with low back pain and gluteal pain. She did well post op but in 2020 her pain returned. She intermittently has pain radiating down her right leg with numbness and tingling - she had some chiropractic treatments which helped. She has recently started PT. CT lumbar spine without any significant spinal stenosis or nerve impingement. She has started cymbalta which seems to help with her gluteal pain- when pain is worse she takes it twice per day. . Recently while doing new PT exercises ( fire hydrant) she started to notice pain radiating down her left leg. She also has pain on her left lateral hip which makes it difficult for her to lie on her left side. We tried a left GT bursa injection which has helped tremendously with left lateral hip pain. Works as     Neurologic symptoms: numbness, tingling weakness, bowel or bladder changes. No recent falls      Location: The pain is located in the right gluteal   2. Left lateral hip radiating down the left leg. Radiation: The pain does radiate down right leg posterior thigh towards the foot with tinglingt- has improved . Pain Score: Currently: 8/10    Quality: Pain is of a aching, stabbing, stiff, tight quality. Aggravating: Pain is exacerbated by  driving   Alleviating: The pain is alleviated by  massage  , walking     Prior Treatments:  Physical therapy: Yes. 2020 Yoga, stretching .

## 2023-11-27 NOTE — PROGRESS NOTES
Zain Johnston presents today for   Chief Complaint   Patient presents with    Lower Back Pain       Is someone accompanying this pt? no    Is the patient using any DME equipment during OV? no      Coordination of Care:  1. Have you been to the ER, urgent care clinic since your last visit? no  Hospitalized since your last visit? no    2. Have you seen or consulted any other health care providers outside of the [address] [place] [address] since your last visit? no Include any pap smears or colon screening.  no

## 2024-02-07 ENCOUNTER — OFFICE VISIT (OUTPATIENT)
Age: 64
End: 2024-02-07
Payer: MEDICARE

## 2024-02-07 VITALS
DIASTOLIC BLOOD PRESSURE: 82 MMHG | RESPIRATION RATE: 18 BRPM | WEIGHT: 129 LBS | HEART RATE: 87 BPM | HEIGHT: 66 IN | SYSTOLIC BLOOD PRESSURE: 131 MMHG | OXYGEN SATURATION: 96 % | BODY MASS INDEX: 20.73 KG/M2 | TEMPERATURE: 98.3 F

## 2024-02-07 DIAGNOSIS — M70.62 GREATER TROCHANTERIC BURSITIS OF LEFT HIP: Primary | ICD-10-CM

## 2024-02-07 DIAGNOSIS — M79.18 CHRONIC GLUTEAL PAIN: ICD-10-CM

## 2024-02-07 DIAGNOSIS — Z98.1 HISTORY OF LUMBAR FUSION: ICD-10-CM

## 2024-02-07 DIAGNOSIS — G89.29 CHRONIC GLUTEAL PAIN: ICD-10-CM

## 2024-02-07 DIAGNOSIS — M79.2 NEURITIS: ICD-10-CM

## 2024-02-07 PROCEDURE — 1123F ACP DISCUSS/DSCN MKR DOCD: CPT | Performed by: PHYSICAL MEDICINE & REHABILITATION

## 2024-02-07 PROCEDURE — 99214 OFFICE O/P EST MOD 30 MIN: CPT | Performed by: PHYSICAL MEDICINE & REHABILITATION

## 2024-02-07 PROCEDURE — 3017F COLORECTAL CA SCREEN DOC REV: CPT | Performed by: PHYSICAL MEDICINE & REHABILITATION

## 2024-02-07 PROCEDURE — G8400 PT W/DXA NO RESULTS DOC: HCPCS | Performed by: PHYSICAL MEDICINE & REHABILITATION

## 2024-02-07 PROCEDURE — 1090F PRES/ABSN URINE INCON ASSESS: CPT | Performed by: PHYSICAL MEDICINE & REHABILITATION

## 2024-02-07 PROCEDURE — 1036F TOBACCO NON-USER: CPT | Performed by: PHYSICAL MEDICINE & REHABILITATION

## 2024-02-07 PROCEDURE — G8420 CALC BMI NORM PARAMETERS: HCPCS | Performed by: PHYSICAL MEDICINE & REHABILITATION

## 2024-02-07 PROCEDURE — G8427 DOCREV CUR MEDS BY ELIG CLIN: HCPCS | Performed by: PHYSICAL MEDICINE & REHABILITATION

## 2024-02-07 PROCEDURE — 20611 DRAIN/INJ JOINT/BURSA W/US: CPT | Performed by: PHYSICAL MEDICINE & REHABILITATION

## 2024-02-07 PROCEDURE — G8484 FLU IMMUNIZE NO ADMIN: HCPCS | Performed by: PHYSICAL MEDICINE & REHABILITATION

## 2024-02-07 RX ORDER — GABAPENTIN 100 MG/1
100 CAPSULE ORAL NIGHTLY
Qty: 90 CAPSULE | Refills: 0 | Status: SHIPPED | OUTPATIENT
Start: 2024-02-07 | End: 2024-05-07

## 2024-02-07 RX ORDER — BUPIVACAINE HYDROCHLORIDE 2.5 MG/ML
30 INJECTION, SOLUTION EPIDURAL; INFILTRATION; INTRACAUDAL ONCE
Status: COMPLETED | OUTPATIENT
Start: 2024-02-07 | End: 2024-02-07

## 2024-02-07 RX ORDER — LIDOCAINE HYDROCHLORIDE 10 MG/ML
3 INJECTION, SOLUTION INFILTRATION; PERINEURAL ONCE
Status: COMPLETED | OUTPATIENT
Start: 2024-02-07 | End: 2024-02-07

## 2024-02-07 RX ORDER — DULOXETIN HYDROCHLORIDE 30 MG/1
30 CAPSULE, DELAYED RELEASE ORAL 2 TIMES DAILY
Qty: 180 CAPSULE | Refills: 0 | Status: SHIPPED | OUTPATIENT
Start: 2024-02-07 | End: 2024-05-07

## 2024-02-07 RX ORDER — BUPIVACAINE HYDROCHLORIDE 2.5 MG/ML
2 INJECTION, SOLUTION INFILTRATION; PERINEURAL ONCE
Status: DISCONTINUED | OUTPATIENT
Start: 2024-02-07 | End: 2024-02-07

## 2024-02-07 RX ADMIN — LIDOCAINE HYDROCHLORIDE 3 ML: 10 INJECTION, SOLUTION INFILTRATION; PERINEURAL at 13:27

## 2024-02-07 RX ADMIN — BUPIVACAINE HYDROCHLORIDE 2 ML: 2.5 INJECTION, SOLUTION EPIDURAL; INFILTRATION; INTRACAUDAL at 13:33

## 2024-02-07 NOTE — PROGRESS NOTES
Madhuri Reyna presents today for   Chief Complaint   Patient presents with    Back Pain    Hip Pain    Pain       Is someone accompanying this pt? no    Is the patient using any DME equipment during OV? no    Depression Screening:       No data to display                Learning Assessment:      Abuse Screening:       No data to display                Fall Risk      OPIOID RISK TOOL      Coordination of Care:  1. Have you been to the ER, urgent care clinic since your last visit? no  Hospitalized since your last visit? no    2. Have you seen or consulted any other health care providers outside of the [facility] since your last visit? no Include any pap smears or colon screening. no      
discharged in good condition after a short observation period. The patient was instructed to avoid submerging the procedure site in water for 48-72 hours. The patient was instructed to contact me with any questions pertaining to the procedure          Key images were saved.          Impression: Technically successful sonographically guided left greater trochanteric bursa           Symone Ordaz MD

## 2024-02-13 ENCOUNTER — TELEPHONE (OUTPATIENT)
Age: 64
End: 2024-02-13

## 2024-02-13 NOTE — TELEPHONE ENCOUNTER
Patient called and asked if the rx for Gabapentin can be approved for an early refill because she has to go out of town soon for her .    Pharmacy:    [id number] [place] [id number] - P [phone] - F [phone] [[id number]]

## 2024-02-13 NOTE — TELEPHONE ENCOUNTER
A  was reviewed and the last fill date for gabapentin was 11/27/23 for #90 days. Pt only takes this at bedtime. This would be about a week to 10 days early. If you are ok with this, I can call the pharmacy and give them a verbal order.

## 2024-02-14 NOTE — TELEPHONE ENCOUNTER
I called and spoke to Eugenio at the [facility]. She was given the verbal order for the pt to get her gabapentin filled early due to travel. The order was VORB'd. She states that they will have this ready for the pt this afternoon. I called and spoke to the pt. The pt was identified using 2 pt identifiers. She was made aware of this and was thankful. Nothing further needed at this time.

## 2024-02-14 NOTE — TELEPHONE ENCOUNTER
Symone Ordaz MD  You19 hours ago (5:01 PM)       That would be great, I am ok with the early order

## 2024-02-28 RX ORDER — DULOXETIN HYDROCHLORIDE 30 MG/1
30 CAPSULE, DELAYED RELEASE ORAL 2 TIMES DAILY
Qty: 180 CAPSULE | Refills: 0 | OUTPATIENT
Start: 2024-02-28

## 2024-03-27 ENCOUNTER — OFFICE VISIT (OUTPATIENT)
Age: 64
End: 2024-03-27
Payer: MEDICARE

## 2024-03-27 VITALS
BODY MASS INDEX: 20.73 KG/M2 | HEIGHT: 66 IN | WEIGHT: 129 LBS | TEMPERATURE: 97.3 F | OXYGEN SATURATION: 98 % | HEART RATE: 81 BPM

## 2024-03-27 DIAGNOSIS — Z98.1 HISTORY OF LUMBAR FUSION: ICD-10-CM

## 2024-03-27 DIAGNOSIS — G89.29 CHRONIC GLUTEAL PAIN: ICD-10-CM

## 2024-03-27 DIAGNOSIS — M70.62 GREATER TROCHANTERIC BURSITIS OF LEFT HIP: ICD-10-CM

## 2024-03-27 DIAGNOSIS — M79.2 NEURITIS: ICD-10-CM

## 2024-03-27 DIAGNOSIS — M79.18 CHRONIC GLUTEAL PAIN: ICD-10-CM

## 2024-03-27 PROCEDURE — 99214 OFFICE O/P EST MOD 30 MIN: CPT | Performed by: PHYSICAL MEDICINE & REHABILITATION

## 2024-03-27 PROCEDURE — 1036F TOBACCO NON-USER: CPT | Performed by: PHYSICAL MEDICINE & REHABILITATION

## 2024-03-27 PROCEDURE — G8400 PT W/DXA NO RESULTS DOC: HCPCS | Performed by: PHYSICAL MEDICINE & REHABILITATION

## 2024-03-27 PROCEDURE — 3017F COLORECTAL CA SCREEN DOC REV: CPT | Performed by: PHYSICAL MEDICINE & REHABILITATION

## 2024-03-27 PROCEDURE — G8427 DOCREV CUR MEDS BY ELIG CLIN: HCPCS | Performed by: PHYSICAL MEDICINE & REHABILITATION

## 2024-03-27 PROCEDURE — G8420 CALC BMI NORM PARAMETERS: HCPCS | Performed by: PHYSICAL MEDICINE & REHABILITATION

## 2024-03-27 PROCEDURE — 1123F ACP DISCUSS/DSCN MKR DOCD: CPT | Performed by: PHYSICAL MEDICINE & REHABILITATION

## 2024-03-27 PROCEDURE — G8484 FLU IMMUNIZE NO ADMIN: HCPCS | Performed by: PHYSICAL MEDICINE & REHABILITATION

## 2024-03-27 PROCEDURE — 1090F PRES/ABSN URINE INCON ASSESS: CPT | Performed by: PHYSICAL MEDICINE & REHABILITATION

## 2024-03-27 RX ORDER — GABAPENTIN 100 MG/1
100 CAPSULE ORAL 2 TIMES DAILY
Qty: 180 CAPSULE | Refills: 0 | Status: SHIPPED | OUTPATIENT
Start: 2024-03-27 | End: 2024-06-25

## 2024-03-27 RX ORDER — DULOXETIN HYDROCHLORIDE 30 MG/1
30 CAPSULE, DELAYED RELEASE ORAL 2 TIMES DAILY
Qty: 180 CAPSULE | Refills: 0 | Status: SHIPPED | OUTPATIENT
Start: 2024-03-27 | End: 2024-06-25

## 2024-05-12 RX ORDER — DULOXETIN HYDROCHLORIDE 30 MG/1
30 CAPSULE, DELAYED RELEASE ORAL 2 TIMES DAILY
Qty: 180 CAPSULE | Refills: 0 | OUTPATIENT
Start: 2024-05-12

## 2024-06-25 RX ORDER — DULOXETIN HYDROCHLORIDE 30 MG/1
30 CAPSULE, DELAYED RELEASE ORAL 2 TIMES DAILY
Qty: 180 CAPSULE | Refills: 0 | Status: SHIPPED | OUTPATIENT
Start: 2024-06-25

## 2024-07-03 ENCOUNTER — OFFICE VISIT (OUTPATIENT)
Age: 64
End: 2024-07-03

## 2024-07-03 VITALS
WEIGHT: 132.8 LBS | SYSTOLIC BLOOD PRESSURE: 136 MMHG | DIASTOLIC BLOOD PRESSURE: 89 MMHG | OXYGEN SATURATION: 98 % | BODY MASS INDEX: 21.34 KG/M2 | HEART RATE: 77 BPM | HEIGHT: 66 IN | TEMPERATURE: 98.2 F

## 2024-07-03 DIAGNOSIS — M79.18 CHRONIC GLUTEAL PAIN: Primary | ICD-10-CM

## 2024-07-03 DIAGNOSIS — M79.18 MYOFASCIAL PAIN: ICD-10-CM

## 2024-07-03 DIAGNOSIS — M70.62 GREATER TROCHANTERIC BURSITIS OF LEFT HIP: ICD-10-CM

## 2024-07-03 DIAGNOSIS — Z98.1 HISTORY OF LUMBAR FUSION: ICD-10-CM

## 2024-07-03 DIAGNOSIS — M79.2 NEURITIS: ICD-10-CM

## 2024-07-03 DIAGNOSIS — G89.29 CHRONIC GLUTEAL PAIN: Primary | ICD-10-CM

## 2024-07-03 RX ORDER — BUPIVACAINE HYDROCHLORIDE 2.5 MG/ML
1 INJECTION, SOLUTION EPIDURAL; INFILTRATION; INTRACAUDAL ONCE
Status: COMPLETED | OUTPATIENT
Start: 2024-07-03 | End: 2024-07-03

## 2024-07-03 RX ORDER — LIDOCAINE HYDROCHLORIDE 10 MG/ML
5 INJECTION, SOLUTION INFILTRATION; PERINEURAL ONCE
Status: COMPLETED | OUTPATIENT
Start: 2024-07-03 | End: 2024-07-03

## 2024-07-03 RX ORDER — GABAPENTIN 100 MG/1
100 CAPSULE ORAL 2 TIMES DAILY
Qty: 180 CAPSULE | Refills: 0 | Status: SHIPPED | OUTPATIENT
Start: 2024-07-03 | End: 2024-10-01

## 2024-07-03 RX ORDER — DOXYCYCLINE HYCLATE 100 MG/1
100 CAPSULE ORAL 2 TIMES DAILY
COMMUNITY

## 2024-07-03 RX ORDER — METHOCARBAMOL 500 MG/1
500 TABLET, FILM COATED ORAL 4 TIMES DAILY
COMMUNITY

## 2024-07-03 RX ADMIN — BUPIVACAINE HYDROCHLORIDE 2.5 MG: 2.5 INJECTION, SOLUTION EPIDURAL; INFILTRATION; INTRACAUDAL at 16:07

## 2024-07-03 RX ADMIN — LIDOCAINE HYDROCHLORIDE 5 ML: 10 INJECTION, SOLUTION INFILTRATION; PERINEURAL at 16:06

## 2024-07-03 NOTE — PROGRESS NOTES
[facility]  [facility] [facility] [address], [address]  [place] [address]  Phone: [phone]  Fax: [phone]      Patient: Madhuri Reyna                                                                              MRN: [id number]        YOB: 1960          AGE: 63 y.o.             PCP: Moira Storey,   Date:  07/03/24    Reason for Consultation: Back Pain (Back pain worse since last vsit)      HPI:  Madhuri Reyna is a 63 y.o. female with relevant PMH of scoliosis  L3-5 PSI fusion fusion in 1/3/2017 who presented with low back pain and gluteal pain.  She did well post op but in 2020 her pain returned.  She intermittently has pain radiating down her right leg with numbness and tingling - she had some chiropractic treatments which helped. CT lumbar spine without any significant spinal stenosis or nerve impingement. She has started cymbalta which seems to help with her gluteal pain- when pain is worse she takes it twice per day.  She has tried GT bursa injections and TPIS when pain located more superiorly which seem to help'    She also recently had a tick bite and was treated with [employer]     Works as - retired    Neurologic symptoms: numbness, tingling weakness, bowel or bladder changes.  No recent falls      Location: The pain is located in the right gluteal   2. Left lateral hip radiating down the left leg.    Radiation: The pain does radiate down right leg posterior thigh towards the foot with tinglingt- has improved .    Pain Score: Currently: 8/10    Quality: Pain is of a aching, stabbing, stiff, tight quality.    Aggravating: Pain is exacerbated by  driving   Alleviating: The pain is alleviated by  massage  , walking     Prior Treatments:  Physical therapy: Yes. 2020 Yoga, stretching . Started PT10/9/2023  Chiropractic: yes  Injections:Yes- most recently.  Right SI joint injection- no relief - Dr. Baltazar  Surgery:Yes- 2018 2018

## 2024-07-03 NOTE — PROGRESS NOTES
Madhuri Reyna presents today for   Chief Complaint   Patient presents with    Back Pain     Back pain worse since last vsit       Is someone accompanying this pt? No    Is the patient using any DME equipment during OV? No      Coordination of Care:  1. Have you been to the ER, urgent care clinic since your last visit? [facility]  Hospitalized since your last visit? No    2. Have you seen or consulted any other health care providers outside of the [facility] since your last visit? NO Include any pap smears or colon screening. No

## 2024-07-10 ENCOUNTER — OFFICE VISIT (OUTPATIENT)
Age: 64
End: 2024-07-10
Payer: MEDICARE

## 2024-07-10 VITALS
DIASTOLIC BLOOD PRESSURE: 80 MMHG | WEIGHT: 129.5 LBS | HEART RATE: 75 BPM | HEIGHT: 66 IN | OXYGEN SATURATION: 98 % | BODY MASS INDEX: 20.81 KG/M2 | SYSTOLIC BLOOD PRESSURE: 126 MMHG | TEMPERATURE: 98 F

## 2024-07-10 DIAGNOSIS — M70.61 GREATER TROCHANTERIC BURSITIS OF RIGHT HIP: Primary | ICD-10-CM

## 2024-07-10 PROCEDURE — 20611 DRAIN/INJ JOINT/BURSA W/US: CPT | Performed by: PHYSICAL MEDICINE & REHABILITATION

## 2024-07-10 PROCEDURE — 99212 OFFICE O/P EST SF 10 MIN: CPT | Performed by: PHYSICAL MEDICINE & REHABILITATION

## 2024-07-10 PROCEDURE — 1123F ACP DISCUSS/DSCN MKR DOCD: CPT | Performed by: PHYSICAL MEDICINE & REHABILITATION

## 2024-07-10 RX ORDER — TRIAMCINOLONE ACETONIDE 40 MG/ML
40 INJECTION, SUSPENSION INTRA-ARTICULAR; INTRAMUSCULAR ONCE
Status: COMPLETED | OUTPATIENT
Start: 2024-07-10 | End: 2024-07-10

## 2024-07-10 RX ORDER — LIDOCAINE HYDROCHLORIDE 10 MG/ML
5 INJECTION, SOLUTION INFILTRATION; PERINEURAL ONCE
Status: COMPLETED | OUTPATIENT
Start: 2024-07-10 | End: 2024-07-10

## 2024-07-10 RX ADMIN — TRIAMCINOLONE ACETONIDE 40 MG: 40 INJECTION, SUSPENSION INTRA-ARTICULAR; INTRAMUSCULAR at 13:24

## 2024-07-10 RX ADMIN — LIDOCAINE HYDROCHLORIDE 5 ML: 10 INJECTION, SOLUTION INFILTRATION; PERINEURAL at 13:23

## 2024-07-10 NOTE — PROGRESS NOTES
Madhuri Reyna presents today for   Chief Complaint   Patient presents with    Hip Pain     C/o right hip pain       Is someone accompanying this pt? No    Is the patient using any DME equipment during OV? No        Coordination of Care:  1. Have you been to the ER, urgent care clinic since your last visit? No  Hospitalized since your last visit? No    2. Have you seen or consulted any other health care providers outside of the [facility] since your last visit? No Include any pap smears or colon screening. No

## 2024-07-10 NOTE — PROGRESS NOTES
[facility]  [address] [place] [address], [address]  [place] [address]  Phone: [phone]  Fax: [phone]      Patient: Madhuri Reyna                                                                              MRN: [id number]        YOB: 1960          AGE: 63 y.o.             PCP: Moira Storey,   Date:  07/10/24    Reason for Consultation: Hip Pain (C/o right hip pain)      HPI:  Madhuri Reyna is a 63 y.o. female with relevant PMH of scoliosis  L3-5 PSI fusion fusion in 1/3/2017 who presented with low back pain and gluteal pain.  She did well post op but in 2020 her pain returned.  She intermittently has pain radiating down her right leg with numbness and tingling - she had some chiropractic treatments which helped. CT lumbar spine without any significant spinal stenosis or nerve impingement. She has started cymbalta which seems to help with her gluteal pain- when pain is worse she takes it twice per day.  She has tried GT bursa injections and TPIS when pain located more superiorly which seem to help.  Today she reports mainly right lateral hip pain.      She also recently had a tick bite and was treated with [employer]line     Works as - retired    Neurologic symptoms: numbness, tingling weakness, bowel or bladder changes.  No recent falls      Location: The pain is located in the right lateral hip   Radiation: The pain does radiate down right leg posterior thigh towards the foot with tingling- has improved .    Pain Score: Currently: 8/10    Quality: Pain is of a aching, stabbing, stiff, tight quality.    Aggravating: Pain is exacerbated by  driving   Alleviating: The pain is alleviated by  massage  , walking     Prior Treatments:  Physical therapy: Yes. 2020 Yoga, stretching . Started PT10/9/2023  Chiropractic: yes  Injections:Yes- most recently.  Right SI joint injection- no relief - Dr. Baltazar  Surgery:Yes- 2018 2018 L3-L5

## 2024-09-16 ENCOUNTER — OFFICE VISIT (OUTPATIENT)
Age: 64
End: 2024-09-16
Payer: MEDICARE

## 2024-09-16 VITALS
WEIGHT: 128 LBS | HEIGHT: 66 IN | TEMPERATURE: 97 F | OXYGEN SATURATION: 96 % | HEART RATE: 89 BPM | DIASTOLIC BLOOD PRESSURE: 86 MMHG | SYSTOLIC BLOOD PRESSURE: 124 MMHG | BODY MASS INDEX: 20.57 KG/M2

## 2024-09-16 DIAGNOSIS — M53.3 SACROILIAC DYSFUNCTION: ICD-10-CM

## 2024-09-16 DIAGNOSIS — M79.18 MYOFASCIAL PAIN ON RIGHT SIDE: ICD-10-CM

## 2024-09-16 DIAGNOSIS — Z98.1 HISTORY OF LUMBAR FUSION: Primary | ICD-10-CM

## 2024-09-16 PROCEDURE — 20552 NJX 1/MLT TRIGGER POINT 1/2: CPT | Performed by: PHYSICAL MEDICINE & REHABILITATION

## 2024-09-16 PROCEDURE — 1123F ACP DISCUSS/DSCN MKR DOCD: CPT | Performed by: PHYSICAL MEDICINE & REHABILITATION

## 2024-09-16 PROCEDURE — 99214 OFFICE O/P EST MOD 30 MIN: CPT | Performed by: PHYSICAL MEDICINE & REHABILITATION

## 2024-09-16 RX ORDER — LIDOCAINE HYDROCHLORIDE 10 MG/ML
1 INJECTION, SOLUTION INFILTRATION; PERINEURAL ONCE
Status: COMPLETED | OUTPATIENT
Start: 2024-09-16 | End: 2024-09-16

## 2024-09-16 RX ORDER — BUPIVACAINE HYDROCHLORIDE 2.5 MG/ML
1 INJECTION, SOLUTION INFILTRATION; PERINEURAL ONCE
Status: COMPLETED | OUTPATIENT
Start: 2024-09-16 | End: 2024-09-16

## 2024-09-16 RX ORDER — NAPROXEN 250 MG/1
250 TABLET ORAL 2 TIMES DAILY PRN
COMMUNITY
Start: 2024-06-28

## 2024-09-16 RX ORDER — TRAMADOL HYDROCHLORIDE 50 MG/1
50 TABLET ORAL EVERY 12 HOURS PRN
Qty: 14 TABLET | Refills: 0 | Status: SHIPPED | OUTPATIENT
Start: 2024-09-16 | End: 2024-09-23

## 2024-09-16 RX ORDER — ACETAMINOPHEN 500 MG
1000 TABLET ORAL EVERY 8 HOURS PRN
COMMUNITY
Start: 2024-06-28

## 2024-09-16 RX ORDER — ZOLPIDEM TARTRATE 10 MG/1
TABLET ORAL
COMMUNITY
Start: 2024-06-27

## 2024-09-16 RX ORDER — DOXYCYCLINE HYCLATE 100 MG
TABLET ORAL
COMMUNITY
Start: 2024-07-11

## 2024-09-16 RX ORDER — LIDOCAINE 50 MG/G
1 PATCH TOPICAL EVERY 24 HOURS
COMMUNITY
Start: 2024-06-28

## 2024-09-16 RX ADMIN — BUPIVACAINE HYDROCHLORIDE 2.5 MG: 2.5 INJECTION, SOLUTION INFILTRATION; PERINEURAL at 14:22

## 2024-09-16 RX ADMIN — LIDOCAINE HYDROCHLORIDE 1 ML: 10 INJECTION, SOLUTION INFILTRATION; PERINEURAL at 14:22
